# Patient Record
Sex: MALE | Race: OTHER | HISPANIC OR LATINO | URBAN - METROPOLITAN AREA
[De-identification: names, ages, dates, MRNs, and addresses within clinical notes are randomized per-mention and may not be internally consistent; named-entity substitution may affect disease eponyms.]

---

## 2022-05-24 ENCOUNTER — TELEPHONE (OUTPATIENT)
Dept: SURGERY | Facility: CLINIC | Age: 67
End: 2022-05-24

## 2022-05-24 NOTE — TELEPHONE ENCOUNTER
Called patient at all available numbers in reference to a referral to  Ambulatory Colorectal Surgery for colon cancer screening, no answer, voicemail not set up

## 2023-08-21 ENCOUNTER — TELEPHONE (OUTPATIENT)
Dept: ENDOSCOPY | Facility: HOSPITAL | Age: 68
End: 2023-08-21

## 2023-08-21 NOTE — TELEPHONE ENCOUNTER
----- Message from Michelle Geller sent at 8/21/2023  8:31 AM CDT -----    ----- Message -----  From: Anne Curiel  Sent: 8/18/2023  11:05 AM CDT  To: [facility] End[facility] Schedulers    Good afternoon,     Patient has a referral scanned into  for a colonoscopy. Please call patient to schedule.     Thank you,   Anne METCALF.   [facility] [place]

## 2023-08-21 NOTE — TELEPHONE ENCOUNTER
MA spoke to pt, he stated he wanted to schedule appt at [facility]. Number provided to him @ [phone]

## 2023-08-22 ENCOUNTER — TELEPHONE (OUTPATIENT)
Dept: GASTROENTEROLOGY | Facility: CLINIC | Age: 68
End: 2023-08-22

## 2023-08-22 NOTE — TELEPHONE ENCOUNTER
----- Message from Lianna Owens LPN sent at 8/21/2023  1:43 PM CDT -----    ----- Message -----  From: Anne Curiel  Sent: 8/21/2023   1:34 PM CDT  To: [facility] Gastro Clinical Staff     Good afternoon,      Patient has a referral scanned into  for a colonoscopy. Please call patient to schedule.      Thank you,   Anne CRAFT   [facility]